# Patient Record
Sex: MALE | Race: WHITE | NOT HISPANIC OR LATINO | URBAN - METROPOLITAN AREA
[De-identification: names, ages, dates, MRNs, and addresses within clinical notes are randomized per-mention and may not be internally consistent; named-entity substitution may affect disease eponyms.]

---

## 2017-05-09 ENCOUNTER — EMERGENCY (EMERGENCY)
Facility: HOSPITAL | Age: 22
LOS: 1 days | Discharge: HOME | End: 2017-05-09
Attending: EMERGENCY MEDICINE | Admitting: EMERGENCY MEDICINE
Payer: COMMERCIAL

## 2017-05-09 VITALS
HEIGHT: 69 IN | RESPIRATION RATE: 12 BRPM | OXYGEN SATURATION: 97 % | DIASTOLIC BLOOD PRESSURE: 65 MMHG | HEART RATE: 80 BPM | TEMPERATURE: 98 F | WEIGHT: 141.1 LBS | SYSTOLIC BLOOD PRESSURE: 127 MMHG
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
## 2017-05-09 DIAGNOSIS — M54.5 LOW BACK PAIN: ICD-10-CM
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
## 2017-05-09 DIAGNOSIS — Z88.0 ALLERGY STATUS TO PENICILLIN: ICD-10-CM
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
## 2017-05-09 DIAGNOSIS — S30.0XXA CONTUSION OF LOWER BACK AND PELVIS, INITIAL ENCOUNTER: ICD-10-CM

## 2017-05-09 PROCEDURE — 99284 EMERGENCY DEPT VISIT MOD MDM: CPT

## 2017-05-10 PROCEDURE — 99284 EMERGENCY DEPT VISIT MOD MDM: CPT | Mod: 25

## 2017-05-10 PROCEDURE — 72220 X-RAY EXAM SACRUM TAILBONE: CPT | Mod: 26

## 2017-05-10 PROCEDURE — 72220 X-RAY EXAM SACRUM TAILBONE: CPT

## 2017-05-10 PROCEDURE — 72110 X-RAY EXAM L-2 SPINE 4/>VWS: CPT | Mod: 26

## 2017-05-10 PROCEDURE — 72110 X-RAY EXAM L-2 SPINE 4/>VWS: CPT

## 2017-05-10 RX ORDER — IBUPROFEN 200 MG
600 TABLET ORAL ONCE
Qty: 0 | Refills: 0 | Status: COMPLETED | OUTPATIENT
Start: 2017-05-10 | End: 2017-05-10

## 2017-05-10 NOTE — ED PROVIDER NOTE - PROGRESS NOTE DETAILS
Discussed with Patient regarding the X-ray findings.  Discussed the risks of occult or ligamentous/tendon injury. Discussed the importance of close prompt follow-up with Orthopaedics for definitive workup and treatment.  Also discussed injury / neuro precautions.  Verbalization of understanding of all instructions was shown and an opportunity was given for questions.    Patient doing well, no acute complaints. Discussed with patient about the nature of the back pain and the importance of outpatient follow-up for continued workup, evaluation and definitive diagnosis.  Discussed back pain and neurologic precautions including numbness, tingling, weakness, fever, and changes in bowel/bladder.  An opportunity to ask questions was given . Understanding of all instructions and precautions was verbalized and the patient will follow-up as outpatient as soon as possible. Patient also understands the possibility of needing additional imaging, ie MRI as outpatient. Patient will return with any changes, concerns, or any worsening / persistent symptoms.

## 2017-05-10 NOTE — ED PROVIDER NOTE - CHPI ED SYMPTOMS NEG
no constipation/no bladder dysfunction/no numbness/no motor function loss/no fatigue/no difficulty bearing weight/no bowel dysfunction/no anorexia/no tingling

## 2017-05-10 NOTE — ED PROVIDER NOTE - ENMT, MLM
Airway patent, Nasal mucosa clear. Mouth with normal mucosa. Throat has no vesicles, no oropharyngeal exudates and uvula is midline. MM Moist. Neck supple.

## 2017-05-10 NOTE — ED PROVIDER NOTE - PHYSICAL EXAMINATION
·  NEUROLOGICAL: ·  Level of Consciousness: alert,  Cranial Nerve and Pupillary Exam: cranial nerves 2-12 intact, central and peripheral vision intact, extra-ocular movements intact.  Neck: normal. No signs of tenderness or edema. Speech: clear, Gait and Weight Bearing: normal, Deep Tendon Reflexes: normal, 2+ reflexes (PT, Knee), Sensation: present and normal in 4 extremities, Coordination: normal, Pronator Drift: none, Straight Leg Raise: normal bilaterally with equal strength bl, Motor: normal. Nl strength (5/5) equal bl x 4, Posturing: normal, Normal saddle sensation.  Nl Gait

## 2017-05-10 NOTE — ED PROVIDER NOTE - OBJECTIVE STATEMENT
20 yo M p/w ~ 30 min pta, slipped in shower, hit low back on shelving in shower. Pt co low back pain and abrasion to low back. No numb/ting/focal weak.no rad of pain to legs / front. No head trauma / loc. no recent illness. No other inj or co. Pt able to walk without difficulty.

## 2017-05-10 NOTE — ED PROVIDER NOTE - MUSCULOSKELETAL, MLM
Spine appears normal, Lower lumbar abrasion (midline), pos diffuse lower lumbar / sacral tenderness, range of motion is not limited, no muscle or joint tenderness except as noted

## 2017-06-11 ENCOUNTER — EMERGENCY (EMERGENCY)
Facility: HOSPITAL | Age: 22
LOS: 1 days | Discharge: HOME | End: 2017-06-11
Attending: EMERGENCY MEDICINE | Admitting: EMERGENCY MEDICINE
Payer: COMMERCIAL

## 2017-06-11 VITALS
OXYGEN SATURATION: 98 % | TEMPERATURE: 98 F | SYSTOLIC BLOOD PRESSURE: 112 MMHG | RESPIRATION RATE: 14 BRPM | DIASTOLIC BLOOD PRESSURE: 82 MMHG | HEART RATE: 88 BPM

## 2017-06-11 VITALS
SYSTOLIC BLOOD PRESSURE: 118 MMHG | TEMPERATURE: 99 F | WEIGHT: 115.96 LBS | RESPIRATION RATE: 12 BRPM | DIASTOLIC BLOOD PRESSURE: 87 MMHG | OXYGEN SATURATION: 97 % | HEART RATE: 91 BPM

## 2017-06-11 DIAGNOSIS — S06.9X1A UNSPECIFIED INTRACRANIAL INJURY WITH LOSS OF CONSCIOUSNESS OF 30 MINUTES OR LESS, INITIAL ENCOUNTER: ICD-10-CM
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
## 2017-06-11 DIAGNOSIS — Z88.0 ALLERGY STATUS TO PENICILLIN: ICD-10-CM

## 2017-06-11 DIAGNOSIS — R55 SYNCOPE AND COLLAPSE: ICD-10-CM

## 2017-06-11 PROCEDURE — 99284 EMERGENCY DEPT VISIT MOD MDM: CPT

## 2017-06-11 PROCEDURE — 70450 CT HEAD/BRAIN W/O DYE: CPT | Mod: 26

## 2017-06-11 PROCEDURE — 72125 CT NECK SPINE W/O DYE: CPT

## 2017-06-11 PROCEDURE — 99284 EMERGENCY DEPT VISIT MOD MDM: CPT | Mod: 25

## 2017-06-11 PROCEDURE — 70450 CT HEAD/BRAIN W/O DYE: CPT

## 2017-06-11 PROCEDURE — 72125 CT NECK SPINE W/O DYE: CPT | Mod: 26

## 2017-06-11 NOTE — ED PROVIDER NOTE - OBJECTIVE STATEMENT
pt c/o ha radiating to back of neck, dizziness, nausea s/p head injury with LOC. 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 no weakness, numbness, cp, sob, abd pain, arm or leg pain. injury occurred 2 hrs ago.

## 2017-06-21 ENCOUNTER — EMERGENCY (EMERGENCY)
Facility: HOSPITAL | Age: 22
LOS: 1 days | Discharge: HOME | End: 2017-06-21
Attending: EMERGENCY MEDICINE | Admitting: EMERGENCY MEDICINE
Payer: COMMERCIAL

## 2017-06-21 VITALS
DIASTOLIC BLOOD PRESSURE: 82 MMHG | HEART RATE: 81 BPM | OXYGEN SATURATION: 100 % | HEIGHT: 69 IN | SYSTOLIC BLOOD PRESSURE: 128 MMHG | RESPIRATION RATE: 14 BRPM | WEIGHT: 149.91 LBS | TEMPERATURE: 98 F

## 2017-06-21 VITALS
RESPIRATION RATE: 17 BRPM | HEART RATE: 74 BPM | TEMPERATURE: 98 F | SYSTOLIC BLOOD PRESSURE: 122 MMHG | DIASTOLIC BLOOD PRESSURE: 80 MMHG | OXYGEN SATURATION: 98 %

## 2017-06-21 DIAGNOSIS — M54.6 PAIN IN THORACIC SPINE: ICD-10-CM
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
## 2017-06-21 DIAGNOSIS — Z88.0 ALLERGY STATUS TO PENICILLIN: ICD-10-CM
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
## 2017-06-21 PROCEDURE — 99283 EMERGENCY DEPT VISIT LOW MDM: CPT

## 2017-06-21 PROCEDURE — 71020: CPT | Mod: 26

## 2017-06-21 PROCEDURE — 99284 EMERGENCY DEPT VISIT MOD MDM: CPT

## 2017-06-21 PROCEDURE — 71046 X-RAY EXAM CHEST 2 VIEWS: CPT

## 2017-06-21 RX ORDER — IBUPROFEN 200 MG
600 TABLET ORAL ONCE
Qty: 0 | Refills: 0 | Status: COMPLETED | OUTPATIENT
Start: 2017-06-21 | End: 2017-06-21

## 2017-06-21 RX ADMIN — Medication 600 MILLIGRAM(S): at 13:52

## 2017-06-21 NOTE — ED PROVIDER NOTE - OBJECTIVE STATEMENT
22 year old male c/o mid thoracic back pain, worse with respiration, after throwing wood over a fence earlier. Pt denies sob, weakness, numbness, chest pain

## 2017-06-21 NOTE — ED ADULT TRIAGE NOTE - CHIEF COMPLAINT QUOTE
" I was throwing wood over a fence and I think I pulled something in my neck. It hurts to take a deep breath in"

## 2017-06-21 NOTE — ED PROVIDER NOTE - ATTENDING CONTRIBUTION TO CARE
21 yo M p/w co back pain, upper back, sp throwing a piece of wood over a fence. Pt denies direct trauma. Some pain with deep insp. No chest pain, No dyspnea. No fever/chills. no recent illness. No recent LANE / easy fatigue. No numb/ting/focal weak. No other inj or co.  Exam with no signs of trauma. MM Moist. Non-toxic, well appearing. Nl resp effort. CTA bl, No W/R/R. Mild tend to bl mid thoracic spine. Nl rest of exam.  CXR with no acute findings. Outpt fu.

## 2017-06-21 NOTE — ED ADULT NURSE NOTE - OBJECTIVE STATEMENT
Patient states he was throwing wood over a fence and felt a pop in his upper back and has pain with movement of deep inspiration

## 2019-11-18 ENCOUNTER — EMERGENCY (EMERGENCY)
Facility: HOSPITAL | Age: 24
LOS: 1 days | Discharge: HOME | End: 2019-11-18
Attending: STUDENT IN AN ORGANIZED HEALTH CARE EDUCATION/TRAINING PROGRAM
Payer: COMMERCIAL

## 2019-11-18 VITALS
RESPIRATION RATE: 18 BRPM | SYSTOLIC BLOOD PRESSURE: 126 MMHG | DIASTOLIC BLOOD PRESSURE: 86 MMHG | OXYGEN SATURATION: 99 % | HEART RATE: 67 BPM

## 2019-11-18 VITALS
OXYGEN SATURATION: 100 % | HEIGHT: 69 IN | WEIGHT: 160.06 LBS | DIASTOLIC BLOOD PRESSURE: 71 MMHG | SYSTOLIC BLOOD PRESSURE: 111 MMHG | RESPIRATION RATE: 20 BRPM | HEART RATE: 84 BPM | TEMPERATURE: 98 F

## 2019-11-18 LAB
ALBUMIN SERPL ELPH-MCNC: 4.3 G/DL — SIGNIFICANT CHANGE UP (ref 3.3–5.2)
ALP SERPL-CCNC: 53 U/L — SIGNIFICANT CHANGE UP (ref 40–120)
ALT FLD-CCNC: 16 U/L — SIGNIFICANT CHANGE UP
ANION GAP SERPL CALC-SCNC: 13 MMOL/L — SIGNIFICANT CHANGE UP (ref 5–17)
AST SERPL-CCNC: 24 U/L — SIGNIFICANT CHANGE UP
BILIRUB SERPL-MCNC: 0.2 MG/DL — LOW (ref 0.4–2)
BUN SERPL-MCNC: 24 MG/DL — HIGH (ref 8–20)
CALCIUM SERPL-MCNC: 9 MG/DL — SIGNIFICANT CHANGE UP (ref 8.6–10.2)
CHLORIDE SERPL-SCNC: 99 MMOL/L — SIGNIFICANT CHANGE UP (ref 98–107)
CO2 SERPL-SCNC: 20 MMOL/L — LOW (ref 22–29)
CREAT SERPL-MCNC: 0.94 MG/DL — SIGNIFICANT CHANGE UP (ref 0.5–1.3)
D DIMER BLD IA.RAPID-MCNC: 237 NG/ML DDU — HIGH
GLUCOSE SERPL-MCNC: 99 MG/DL — SIGNIFICANT CHANGE UP (ref 70–115)
HCT VFR BLD CALC: 43.4 % — SIGNIFICANT CHANGE UP (ref 39–50)
HGB BLD-MCNC: 13.9 G/DL — SIGNIFICANT CHANGE UP (ref 13–17)
LIDOCAIN IGE QN: 30 U/L — SIGNIFICANT CHANGE UP (ref 22–51)
MCHC RBC-ENTMCNC: 27.8 PG — SIGNIFICANT CHANGE UP (ref 27–34)
MCHC RBC-ENTMCNC: 32 GM/DL — SIGNIFICANT CHANGE UP (ref 32–36)
MCV RBC AUTO: 86.8 FL — SIGNIFICANT CHANGE UP (ref 80–100)
PLATELET # BLD AUTO: 215 K/UL — SIGNIFICANT CHANGE UP (ref 150–400)
POTASSIUM SERPL-MCNC: 4 MMOL/L — SIGNIFICANT CHANGE UP (ref 3.5–5.3)
POTASSIUM SERPL-SCNC: 4 MMOL/L — SIGNIFICANT CHANGE UP (ref 3.5–5.3)
PROT SERPL-MCNC: 7.2 G/DL — SIGNIFICANT CHANGE UP (ref 6.6–8.7)
RBC # BLD: 5 M/UL — SIGNIFICANT CHANGE UP (ref 4.2–5.8)
RBC # FLD: 12.1 % — SIGNIFICANT CHANGE UP (ref 10.3–14.5)
SODIUM SERPL-SCNC: 132 MMOL/L — LOW (ref 135–145)
TROPONIN T SERPL-MCNC: <0.01 NG/ML — SIGNIFICANT CHANGE UP (ref 0–0.06)
WBC # BLD: 9.13 K/UL — SIGNIFICANT CHANGE UP (ref 3.8–10.5)
WBC # FLD AUTO: 9.13 K/UL — SIGNIFICANT CHANGE UP (ref 3.8–10.5)

## 2019-11-18 PROCEDURE — 85027 COMPLETE CBC AUTOMATED: CPT

## 2019-11-18 PROCEDURE — 85379 FIBRIN DEGRADATION QUANT: CPT

## 2019-11-18 PROCEDURE — 80053 COMPREHEN METABOLIC PANEL: CPT

## 2019-11-18 PROCEDURE — 96374 THER/PROPH/DIAG INJ IV PUSH: CPT

## 2019-11-18 PROCEDURE — 71046 X-RAY EXAM CHEST 2 VIEWS: CPT | Mod: 26

## 2019-11-18 PROCEDURE — 83690 ASSAY OF LIPASE: CPT

## 2019-11-18 PROCEDURE — 36415 COLL VENOUS BLD VENIPUNCTURE: CPT

## 2019-11-18 PROCEDURE — 71046 X-RAY EXAM CHEST 2 VIEWS: CPT

## 2019-11-18 PROCEDURE — 93010 ELECTROCARDIOGRAM REPORT: CPT

## 2019-11-18 PROCEDURE — 93005 ELECTROCARDIOGRAM TRACING: CPT

## 2019-11-18 PROCEDURE — 99284 EMERGENCY DEPT VISIT MOD MDM: CPT | Mod: 25

## 2019-11-18 PROCEDURE — 99285 EMERGENCY DEPT VISIT HI MDM: CPT

## 2019-11-18 PROCEDURE — 84484 ASSAY OF TROPONIN QUANT: CPT

## 2019-11-18 RX ORDER — FAMOTIDINE 10 MG/ML
20 INJECTION INTRAVENOUS ONCE
Refills: 0 | Status: COMPLETED | OUTPATIENT
Start: 2019-11-18 | End: 2019-11-18

## 2019-11-18 RX ORDER — LIDOCAINE 4 G/100G
10 CREAM TOPICAL ONCE
Refills: 0 | Status: COMPLETED | OUTPATIENT
Start: 2019-11-18 | End: 2019-11-18

## 2019-11-18 RX ADMIN — Medication 30 MILLILITER(S): at 20:09

## 2019-11-18 RX ADMIN — LIDOCAINE 10 MILLILITER(S): 4 CREAM TOPICAL at 20:09

## 2019-11-18 RX ADMIN — FAMOTIDINE 20 MILLIGRAM(S): 10 INJECTION INTRAVENOUS at 20:09

## 2019-11-18 NOTE — ED PROVIDER NOTE - PROGRESS NOTE DETAILS
Labs noted with elevated D-dimer will obtain a CTA of the chest After medication patient was pain free. Discussed lab results and desire to obtain CT angio. Patient understood risk and benefits and would prefer to follow-up with his primary care doctor. Copy of labs given to the patient.

## 2019-11-18 NOTE — ED ADULT NURSE NOTE - OBJECTIVE STATEMENT
pt c/o mid sternal chest pain that started today. pt c/o cough for 4 days. pt received nitro from EMS. denies any previous episodes, reports pain in non radiating and subsiding, reports sudden onset while sitting in chair, denies strenuous activity

## 2019-11-18 NOTE — ED PROVIDER NOTE - OBJECTIVE STATEMENT
23 y/o M pt with no significant PMHx presents to the ED c/o sudden onset 2/10 CP that started at 15:00. Pt states that he was at work when he began to have "tight" mid-sternal, non radiating CP. He immediately tried to stand up and at this time he had a sudden onset of lightheadedness and near syncope, which has since resolved. Since 15:00 the CP has been constant ans is currently a 5/10. Denies any aggravating factors. Denies N/V, bitter taste, blurry vision, lower extremity edema, recent trauma. No cardiac FHx. Non-Tobacco User. No illicit drug usage. No further complaints at this time.  Occupation:

## 2019-11-18 NOTE — ED ADULT TRIAGE NOTE - CHIEF COMPLAINT QUOTE
pt c/o mid sternal chest pain that started today. pt c/o cough for 4 days. pt received nitro from EMS.

## 2019-11-18 NOTE — ED PROVIDER NOTE - PATIENT PORTAL LINK FT
You can access the FollowMyHealth Patient Portal offered by [facility] by registering at the following website: [email or web address]. By joining [employer]’s FollowMyHealth portal, you will also be able to view your health information using other applications (apps) compatible with our system.

## 2019-11-18 NOTE — ED ADULT NURSE REASSESSMENT NOTE - NS ED NURSE REASSESS COMMENT FT1
MD Urbina at pt bedside, test results explained POC discussed, pt reports feeling better, refused any further testing reporting wanting to go home at [facility] stime, pt asymptomatic, offers no complaints, discharge instructions given and explained, including discharge medication purpose, dose, frequency and s/e, pt verbalized understanding of instructions, questions encouraged and answered appropriately, PIV d/c'd per facility protocol, pt tolerated well, DCD in place, VSS, pt safely discharged home.

## 2019-11-18 NOTE — ED ADULT NURSE NOTE - CHPI ED NUR SYMPTOMS NEG
no syncope/no nausea/no back pain/no dizziness/no fever/no diaphoresis/no congestion/no shortness of breath/no vomiting/no chills

## 2022-09-02 NOTE — ED ADULT TRIAGE NOTE - ARRIVAL FROM
[de-identified] : Back / neck: mild tenderness lumbar > cervical paraspinals, nontender vertebrals.  Good range of motion neck, good flexion / extension / lateral bending and twisting back.  Negative SLR testing B LE, 5 / 5 Deltoid - IO's, hip flexors - GS B LE, 2+ B Bi / Tri, A/PJ DTR's.  Negative Bryant's, clonus. [de-identified] : INTERPRETATION: CLINICAL INDICATION: Severe back pain. Status post heat ablation for varicose veins.\par \par TECHNIQUE: Multiplanar multisequence MRI of the lumbar spine was performed before and after the intravenous administration of contrast according to standard protocol. 7.5 ml of Gadavist IV contrast was administered.\par \par COMPARISON: Lumbar spine radiograph 6/26/2022.\par \par FINDINGS:\par \par ALIGNMENT: The alignment is normal.\par \par VERTEBRAE: The vertebral bodies are normal in height. There is no fracture or aggressive osseous lesion. Mild multilevel degenerative changes.\par \par DISCS: Multilevel disc desiccation and loss of height.\par \par CONUS MEDULLARIS AND CAUDA EQUINA: The conus medullaris terminates at T12-L1. There is normal appearance of the conus medullaris and cauda equina.\par \par EVALUATION OF INDIVIDUAL LEVELS:\par L1-2: No disc herniation, spinal canal or neuroforaminal stenosis.\par \par L2-3: Mild disc bulge. No spinal canal or neuroforaminal stenosis.\par \par L3-4: Mild disc bulge. No spinal canal or neuroforaminal stenosis.\par \par L4-5: Disc bulge with central annular fissure and superimposed shallow central disc protrusion. No spinal canal or neuroforaminal stenosis.\par \par L5-S1: No disc herniation, spinal canal or neuroforaminal stenosis.\par \par LIMITED EVALUATION OF UPPER SACRUM AND SACROILIAC JOINTS: Unremarkable.\par \par PARAVERTEBRAL SOFT TISSUES AND VISUALIZED RETROPERITONEUM: The visualized paravertebral soft tissues appear within normal limits.\par \par IMPRESSION:\par \par Mild multilevel degenerative changes. No spinal canal or neuroforaminal stenosis. Normal appearance of the conus medullaris and cauda equina.\par \par --- End of Report ---\par \par SHLOMO MARROQUIN MD; Attending Radiologist\par This document has been electronically signed. Jun 30 2022 8:59AM\par \par ACC: [id number] EXAM: MR SPINE CERVICAL WAW IC\par \par PROCEDURE DATE: 06/29/2022\par \par INTERPRETATION: CLINICAL INDICATION: Cervical radiculopathy.\par \par TECHNIQUE: Multiplanar multisequence MRI of the cervical spine was performed before and after the intravenous administration of contrast, according to standard protocol. 7.5 ml of Gadavist IV contrast was administered.\par \par COMPARISON: None available.\par \par FINDINGS:\par \par ALIGNMENT: The alignment is normal.\par \par VERTEBRAE: The vertebral bodies are normal in height. No acute fracture or aggressive osseous lesion.\par \par DISCS: Multilevel disc desiccation.\par \par CORD: There is no intrinsic spinal cord signal abnormality.\par \par ENHANCEMENT: No abnormal enhancement identified.\par \par PARAVERTEBRAL SOFT TISSUES: The visualized paravertebral soft tissues appear within normal limits.\par \par EVALUATION OF INDIVIDUAL LEVELS:\par C2-3: No disc herniation, spinal canal or neuroforaminal stenosis.\par C3-4: No disc herniation, spinal canal or neuroforaminal stenosis.\par C4-5: No disc herniation, spinal canal or neuroforaminal stenosis.\par C5-6: Mild disc bulge. No spinal canal or neuroforaminal stenosis.\par C6-7: No disc herniation, spinal canal or neuroforaminal stenosis.\par C7-T1: No disc herniation, spinal canal or neuroforaminal stenosis.\par \par Other: Partially visualized expanded and partially empty sella.\par \par IMPRESSION:\par \par Minimal multilevel degenerative changes. No spinal canal or neuroforaminal stenosis. Normal appearance of the cervical cord.\par \par --- End of Report ---\par \par SHLOMO MARROQUIN MD; Attending Radiologist\par This document has been electronically signed. Jun 30 2022 8:51AM\par \par ACC: [id number] EXAM: XR T SPINE MIN 4 VIEWS\par ACC: [id number] EXAM: XR LS SPINE AP LAT 2-3 VIEWS\par \par PROCEDURE DATE: 06/26/2022\par \par INTERPRETATION: CLINICAL INFORMATION: Back pain\par \par TECHNIQUE: AP and lateral views of the thoracolumbar spine were obtained.\par \par COMPARISON: No prior studies are available for comparison.\par \par FINDINGS:\par The vertebral body heights are maintained. No acute fracture or subluxation is identified. There are no acute malalignments of the vertebral bodies. The visualized soft tissues are unremarkable. The visualized lungs are clear.\par \par IMPRESSION:\par No evidence of acute compression deformity or traumatic malalignment of the thoracolumbar spine.\par \par --- End of Report ---\par \par HERBERT HOOKER MD; Resident Radiology\par This document has been electronically signed.\par JEANETTE MARCUM MD; Attending Radiologist\par This document has been electronically signed. Jun 27 2022 6:58AM Home

## 2023-01-26 ENCOUNTER — EMERGENCY (EMERGENCY)
Facility: HOSPITAL | Age: 28
LOS: 1 days | Discharge: HOME | End: 2023-01-26
Admitting: EMERGENCY MEDICINE
Payer: COMMERCIAL

## 2023-01-26 VITALS
SYSTOLIC BLOOD PRESSURE: 133 MMHG | OXYGEN SATURATION: 97 % | HEART RATE: 80 BPM | RESPIRATION RATE: 16 BRPM | TEMPERATURE: 99 F | DIASTOLIC BLOOD PRESSURE: 70 MMHG

## 2023-01-26 VITALS
RESPIRATION RATE: 16 BRPM | DIASTOLIC BLOOD PRESSURE: 73 MMHG | SYSTOLIC BLOOD PRESSURE: 128 MMHG | HEART RATE: 76 BPM | OXYGEN SATURATION: 100 % | TEMPERATURE: 98 F

## 2023-01-26 PROBLEM — Z78.9 OTHER SPECIFIED HEALTH STATUS: Chronic | Status: ACTIVE | Noted: 2019-11-19

## 2023-01-26 LAB
ALBUMIN SERPL ELPH-MCNC: 4.8 G/DL — SIGNIFICANT CHANGE UP (ref 3.3–5)
ALP SERPL-CCNC: 57 U/L — SIGNIFICANT CHANGE UP (ref 40–120)
ALT FLD-CCNC: 16 U/L — SIGNIFICANT CHANGE UP (ref 4–41)
ANION GAP SERPL CALC-SCNC: 11 MMOL/L — SIGNIFICANT CHANGE UP (ref 7–14)
APTT BLD: 31.9 SEC — SIGNIFICANT CHANGE UP (ref 27–36.3)
AST SERPL-CCNC: 20 U/L — SIGNIFICANT CHANGE UP (ref 4–40)
BASOPHILS # BLD AUTO: 0.03 K/UL — SIGNIFICANT CHANGE UP (ref 0–0.2)
BASOPHILS NFR BLD AUTO: 0.6 % — SIGNIFICANT CHANGE UP (ref 0–2)
BILIRUB SERPL-MCNC: 0.6 MG/DL — SIGNIFICANT CHANGE UP (ref 0.2–1.2)
BUN SERPL-MCNC: 12 MG/DL — SIGNIFICANT CHANGE UP (ref 7–23)
CALCIUM SERPL-MCNC: 9.7 MG/DL — SIGNIFICANT CHANGE UP (ref 8.4–10.5)
CHLORIDE SERPL-SCNC: 104 MMOL/L — SIGNIFICANT CHANGE UP (ref 98–107)
CO2 SERPL-SCNC: 25 MMOL/L — SIGNIFICANT CHANGE UP (ref 22–31)
CREAT SERPL-MCNC: 1.03 MG/DL — SIGNIFICANT CHANGE UP (ref 0.5–1.3)
EGFR: 103 ML/MIN/1.73M2 — SIGNIFICANT CHANGE UP
EOSINOPHIL # BLD AUTO: 0.05 K/UL — SIGNIFICANT CHANGE UP (ref 0–0.5)
EOSINOPHIL NFR BLD AUTO: 0.9 % — SIGNIFICANT CHANGE UP (ref 0–6)
GLUCOSE SERPL-MCNC: 75 MG/DL — SIGNIFICANT CHANGE UP (ref 70–99)
HCT VFR BLD CALC: 44 % — SIGNIFICANT CHANGE UP (ref 39–50)
HGB BLD-MCNC: 14.1 G/DL — SIGNIFICANT CHANGE UP (ref 13–17)
IANC: 2.79 K/UL — SIGNIFICANT CHANGE UP (ref 1.8–7.4)
IMM GRANULOCYTES NFR BLD AUTO: 0.4 % — SIGNIFICANT CHANGE UP (ref 0–0.9)
INR BLD: 1.15 RATIO — SIGNIFICANT CHANGE UP (ref 0.88–1.16)
LYMPHOCYTES # BLD AUTO: 2.1 K/UL — SIGNIFICANT CHANGE UP (ref 1–3.3)
LYMPHOCYTES # BLD AUTO: 39.2 % — SIGNIFICANT CHANGE UP (ref 13–44)
MCHC RBC-ENTMCNC: 26.8 PG — LOW (ref 27–34)
MCHC RBC-ENTMCNC: 32 GM/DL — SIGNIFICANT CHANGE UP (ref 32–36)
MCV RBC AUTO: 83.5 FL — SIGNIFICANT CHANGE UP (ref 80–100)
MONOCYTES # BLD AUTO: 0.37 K/UL — SIGNIFICANT CHANGE UP (ref 0–0.9)
MONOCYTES NFR BLD AUTO: 6.9 % — SIGNIFICANT CHANGE UP (ref 2–14)
NEUTROPHILS # BLD AUTO: 2.79 K/UL — SIGNIFICANT CHANGE UP (ref 1.8–7.4)
NEUTROPHILS NFR BLD AUTO: 52 % — SIGNIFICANT CHANGE UP (ref 43–77)
NRBC # BLD: 0 /100 WBCS — SIGNIFICANT CHANGE UP (ref 0–0)
NRBC # FLD: 0 K/UL — SIGNIFICANT CHANGE UP (ref 0–0)
PLATELET # BLD AUTO: 252 K/UL — SIGNIFICANT CHANGE UP (ref 150–400)
POTASSIUM SERPL-MCNC: 4.2 MMOL/L — SIGNIFICANT CHANGE UP (ref 3.5–5.3)
POTASSIUM SERPL-SCNC: 4.2 MMOL/L — SIGNIFICANT CHANGE UP (ref 3.5–5.3)
PROT SERPL-MCNC: 7.5 G/DL — SIGNIFICANT CHANGE UP (ref 6–8.3)
PROTHROM AB SERPL-ACNC: 13.4 SEC — SIGNIFICANT CHANGE UP (ref 10.5–13.4)
RBC # BLD: 5.27 M/UL — SIGNIFICANT CHANGE UP (ref 4.2–5.8)
RBC # FLD: 12.8 % — SIGNIFICANT CHANGE UP (ref 10.3–14.5)
SODIUM SERPL-SCNC: 140 MMOL/L — SIGNIFICANT CHANGE UP (ref 135–145)
TROPONIN T, HIGH SENSITIVITY RESULT: <6 NG/L — SIGNIFICANT CHANGE UP
WBC # BLD: 5.36 K/UL — SIGNIFICANT CHANGE UP (ref 3.8–10.5)
WBC # FLD AUTO: 5.36 K/UL — SIGNIFICANT CHANGE UP (ref 3.8–10.5)

## 2023-01-26 PROCEDURE — 99284 EMERGENCY DEPT VISIT MOD MDM: CPT

## 2023-01-26 PROCEDURE — 71046 X-RAY EXAM CHEST 2 VIEWS: CPT | Mod: 26

## 2023-01-26 NOTE — ED ADULT NURSE NOTE - OBJECTIVE STATEMENT
Pt brought in by EMS c/p chest pressure since this morning. Pt arrives with 20g PIV in L AC, placed by EMS. Pt awaiting MD subramanian. Safety maintained. Awaiting orders.

## 2023-01-26 NOTE — ED ADULT NURSE NOTE - CHIEF COMPLAINT QUOTE
patient states" I am having chest pressure since this morning"  denies PMH states I feel like some one is pushing me on my chest. 20 anam Angiocath  to [address] by EMS

## 2023-01-26 NOTE — ED PROVIDER NOTE - CLINICAL SUMMARY MEDICAL DECISION MAKING FREE TEXT BOX
26 y/o M with no pmhx presents to the ED for chest pain x 7 hours.  Concern for ACS vs HCOM vs Pneumothorax  Will do labs, CXR, ECG 26 y/o M with no pmhx presents to the ED for chest pain x 7 hours. Sudden onset, will r/o ptx although normal breath sounds on exam. Heart score 0, PERC negative. Will check cbc, trop, cxr, serial ekg,likely outpt cardiology f/u.

## 2023-01-26 NOTE — ED PROVIDER NOTE - PROGRESS NOTE DETAILS
Supervising Statement (NORA Perez, PA-C): I have personally seen and examined this patient.  I have fully participated in the care of this patient. I have reviewed all pertinent clinical information, including history, physical exam, plan and PA student’s note and agree as noted.

## 2023-01-26 NOTE — ED PROVIDER NOTE - PATIENT PORTAL LINK FT
You can access the FollowMyHealth Patient Portal offered by [facility] by registering at the following website: [email or web address]. By joining [employer]’s FollowMyHealth portal, you will also be able to view your health information using other applications (apps) compatible with our system.

## 2023-01-26 NOTE — ED ADULT TRIAGE NOTE - MODE OF ARRIVAL
VITAL SIGNS: I have reviewed nursing notes and confirm.  CONSTITUTIONAL: Well-developed; well-nourished; in no acute distress.  SKIN: Skin exam is warm and dry, no acute rash.  HEAD: Normocephalic; atraumatic.  EYES: PERRL, EOM intact; conjunctiva and sclera clear.  ENT: No nasal discharge; airway clear. TMs clear.  FACE/DENTAL:  R facial swelling, mildly ttp, R upper ginginval abscess adjacent to tooth #3  NECK: Supple; non tender.  CARD: S1, S2 normal; no murmurs, gallops, or rubs. Regular rate and rhythm.  RESP: No wheezes, rales or rhonchi. EMS Ambulance

## 2023-01-26 NOTE — ED PROVIDER NOTE - CARDIAC, MLM
Normal rate, regular rhythm.  Heart sounds S1, S2.  No murmurs, rubs or gallops. Normal rate, regular rhythm.  Heart sounds S1, S2. No lower ext edema or calf tenderness elder.

## 2023-01-26 NOTE — ED PROVIDER NOTE - NSFOLLOWUPINSTRUCTIONS_ED_ALL_ED_FT
See your primary care doctor within 24-48 hours. Follow up with cardiology for further evaluation, our discharge center will call you to help schedule an appt. Return to the ER for shortness of breath, chest pain, vomiting, fever, or any other complaints.

## 2024-06-27 NOTE — ED PROVIDER NOTE - AREA
lower Add 52 Modifier (Optional): no Spray Paint Text: The liquid nitrogen was applied to the skin utilizing a spray paint frosting technique. Medical Necessity Information: It is in your best interest to select a reason for this procedure from the list below. All of these items fulfill various CMS LCD requirements except the new and changing color options. Consent: The patient's consent was obtained including but not limited to risks of crusting, scabbing, blistering, scarring, darker or lighter pigmentary change, recurrence, incomplete removal and infection. Show Aperture Variable?: Yes Application Tool (Optional): Cry-AC Number Of Freeze-Thaw Cycles: 3 freeze-thaw cycles Medical Necessity Clause: This procedure was medically necessary because the lesions that were treated were: Detail Level: Detailed Post-Care Instructions: I reviewed with the patient in detail post-care instructions. Patient is to wear sunprotection, and avoid picking at any of the treated lesions. Pt may apply Vaseline to crusted or scabbing areas. Duration Of Freeze Thaw-Cycle (Seconds): 5-10

## 2025-02-06 NOTE — ED ADULT NURSE NOTE - CAS EDN DISCHARGE ASSESSMENT
02/06/25      Evelin Lima  [address]  [place] [address]      Dear Evelin,    Upon chart review, we have noticed you are overdue for an annual wellness visit along with establishing care with a primary care physician.    Please Call [phone] if you are planning to continue your care at the [facility].  Dr. Jefe Wood and Dr. Latoya Bishop are both accepting patients at this time.    Please reach out with any questions or concerns. Thank you.     Sincerely,       [place] [address]   [facility] [place] [address]  Phone: [phone]  Fax: [phone]                   Alert and oriented to person, place and time